# Patient Record
Sex: FEMALE | Race: BLACK OR AFRICAN AMERICAN | Employment: UNEMPLOYED | URBAN - METROPOLITAN AREA
[De-identification: names, ages, dates, MRNs, and addresses within clinical notes are randomized per-mention and may not be internally consistent; named-entity substitution may affect disease eponyms.]

---
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
## 2022-07-29 ENCOUNTER — HOSPITAL ENCOUNTER (EMERGENCY)
Age: 22
Discharge: HOME | End: 2022-07-29

## 2022-07-29 ENCOUNTER — APPOINTMENT (OUTPATIENT)
Dept: ULTRASOUND IMAGING | Age: 22
End: 2022-07-29

## 2022-07-29 VITALS
DIASTOLIC BLOOD PRESSURE: 74 MMHG | BODY MASS INDEX: 17.15 KG/M2 | TEMPERATURE: 97.6 F | SYSTOLIC BLOOD PRESSURE: 125 MMHG | WEIGHT: 106.7 LBS | OXYGEN SATURATION: 100 % | RESPIRATION RATE: 18 BRPM | HEIGHT: 66 IN | HEART RATE: 70 BPM

## 2022-07-29 DIAGNOSIS — N73.9 FEMALE PELVIC INFLAMMATORY DISEASE: ICD-10-CM

## 2022-07-29 DIAGNOSIS — N94.9 CERVICAL MOTION TENDERNESS: ICD-10-CM

## 2022-07-29 DIAGNOSIS — Z20.2 POSSIBLE EXPOSURE TO STD: Primary | ICD-10-CM

## 2022-07-29 LAB
BACTERIA WET PREP: ABNORMAL
BACTERIA WET PREP: ABNORMAL
BACTERIA: ABNORMAL /HPF
BILIRUBIN URINE: NEGATIVE
BLOOD, URINE: ABNORMAL
CLARITY: CLEAR
CLUE CELLS: ABNORMAL
CLUE CELLS: ABNORMAL
COLOR: YELLOW
EPITHELIAL CELLS WET PREP: ABNORMAL
EPITHELIAL CELLS WET PREP: ABNORMAL
EPITHELIAL CELLS, UA: 7 /HPF (ref 0–5)
GLUCOSE URINE: NEGATIVE MG/DL
HCG(URINE) PREGNANCY TEST: NEGATIVE
HYALINE CASTS: 1 /LPF (ref 0–8)
KETONES, URINE: NEGATIVE MG/DL
LEUKOCYTE ESTERASE, URINE: ABNORMAL
MICROSCOPIC EXAMINATION: YES
NITRITE, URINE: NEGATIVE
PH UA: 6 (ref 5–8)
PROTEIN UA: NEGATIVE MG/DL
RBC UA: 2 /HPF (ref 0–4)
RBC WET PREP: ABNORMAL
RBC WET PREP: ABNORMAL
SOURCE WET PREP: ABNORMAL
SOURCE WET PREP: ABNORMAL
SPECIFIC GRAVITY UA: 1.02 (ref 1–1.03)
TRICHOMONAS PREP: ABNORMAL
TRICHOMONAS PREP: ABNORMAL
URINE REFLEX TO CULTURE: YES
URINE TYPE: ABNORMAL
UROBILINOGEN, URINE: 0.2 E.U./DL
WBC UA: 17 /HPF (ref 0–5)
WBC WET PREP: ABNORMAL
WBC WET PREP: ABNORMAL
YEAST WET PREP: ABNORMAL
YEAST WET PREP: ABNORMAL

## 2022-07-29 PROCEDURE — 84703 CHORIONIC GONADOTROPIN ASSAY: CPT

## 2022-07-29 PROCEDURE — 87591 N.GONORRHOEAE DNA AMP PROB: CPT

## 2022-07-29 PROCEDURE — 96372 THER/PROPH/DIAG INJ SC/IM: CPT

## 2022-07-29 PROCEDURE — 76856 US EXAM PELVIC COMPLETE: CPT

## 2022-07-29 PROCEDURE — 87252 VIRUS INOCULATION TISSUE: CPT

## 2022-07-29 PROCEDURE — 6360000002 HC RX W HCPCS: Performed by: PHYSICIAN ASSISTANT

## 2022-07-29 PROCEDURE — 87210 SMEAR WET MOUNT SALINE/INK: CPT

## 2022-07-29 PROCEDURE — 87253 VIRUS INOCULATE TISSUE ADDL: CPT

## 2022-07-29 PROCEDURE — 81001 URINALYSIS AUTO W/SCOPE: CPT

## 2022-07-29 PROCEDURE — 87086 URINE CULTURE/COLONY COUNT: CPT

## 2022-07-29 PROCEDURE — 99284 EMERGENCY DEPT VISIT MOD MDM: CPT

## 2022-07-29 PROCEDURE — 76830 TRANSVAGINAL US NON-OB: CPT

## 2022-07-29 PROCEDURE — 87491 CHLMYD TRACH DNA AMP PROBE: CPT

## 2022-07-29 RX ORDER — DOXYCYCLINE HYCLATE 100 MG
100 TABLET ORAL 2 TIMES DAILY
Qty: 28 TABLET | Refills: 0 | Status: SHIPPED | OUTPATIENT
Start: 2022-07-29 | End: 2022-08-12

## 2022-07-29 RX ORDER — METRONIDAZOLE 500 MG/1
500 TABLET ORAL 2 TIMES DAILY
Qty: 28 TABLET | Refills: 0 | Status: SHIPPED | OUTPATIENT
Start: 2022-07-29 | End: 2022-08-12

## 2022-07-29 RX ORDER — CEFTRIAXONE 500 MG/1
500 INJECTION, POWDER, FOR SOLUTION INTRAMUSCULAR; INTRAVENOUS ONCE
Status: COMPLETED | OUTPATIENT
Start: 2022-07-29 | End: 2022-07-29

## 2022-07-29 RX ADMIN — CEFTRIAXONE SODIUM 500 MG: 500 INJECTION, POWDER, FOR SOLUTION INTRAMUSCULAR; INTRAVENOUS at 14:09

## 2022-07-29 ASSESSMENT — ENCOUNTER SYMPTOMS
ABDOMINAL PAIN: 0
BACK PAIN: 0
EYE PAIN: 0
VOMITING: 0
DIARRHEA: 0
COUGH: 0
SHORTNESS OF BREATH: 0
NAUSEA: 0

## 2022-07-29 ASSESSMENT — PAIN - FUNCTIONAL ASSESSMENT: PAIN_FUNCTIONAL_ASSESSMENT: NONE - DENIES PAIN

## 2022-07-29 NOTE — ED PROVIDER NOTES
**ADVANCED PRACTICE PROVIDER, I HAVE EVALUATED THIS PATIENT**        [address]      Pt Name: Klaus Presley  MUL:[id number]  [place] 2000  Date of evaluation: 7/29/2022  Provider: BERNIE Matthew      Chief Complaint:    Chief Complaint   Patient presents with    Dysuria     Pt with vaginal discomfort, burning with urination, and rash. Nursing Notes, Past Medical Hx, Past Surgical Hx, Social Hx, Allergies, and Family Hx were all reviewed and agreed with or any disagreements were addressed in the HPI.    HPI: (Location, Duration, Timing, Severity, Quality, Assoc Sx, Context, Modifying factors)    Chief Complaint of genital discomfort    This is a  24 y.o. female who presents to the emergency department for evaluation of a 1 week history of genital discomfort, reporting rash, dysuria. She is sexually active and has had a new sexual partner since about a month ago. She does report that they use protection with condoms. She has never had lesions like this before. She does shave and does get occasional ingrown hairs. She denies fever, chills, abdominal pain, weight loss. PastMedical/Surgical History:  No past medical history on file. No past surgical history on file. Medications:  Discharge Medication List as of 7/29/2022  2:13 PM        CONTINUE these medications which have NOT CHANGED    Details   ibuprofen (ADVIL;MOTRIN) 600 MG tablet Take 1 tablet by mouth every 6 hours as needed for Pain, Disp-30 tablet, R-0Print               Review of Systems:  (2-9 systems needed)  Review of Systems   Constitutional:  Negative for chills, fatigue and fever. Eyes:  Negative for pain. Respiratory:  Negative for cough and shortness of breath. Cardiovascular:  Negative for chest pain. Gastrointestinal:  Negative for abdominal pain, diarrhea, nausea and vomiting. Genitourinary:  Positive for dysuria and genital sores. Musculoskeletal:  Negative for back pain, neck pain and neck stiffness. Skin:  Negative for rash. Neurological:  Negative for dizziness and headaches. Psychiatric/Behavioral:  Negative for confusion. \"Positives and Pertinent negatives as per HPI\"    Physical Exam:  Physical Exam  Vitals and nursing note reviewed. Exam conducted with a chaperone present. Constitutional:       General: She is not in acute distress. Appearance: She is well-developed. She is not diaphoretic. HENT:      Head: Normocephalic and atraumatic. Eyes:      General:         Right eye: No discharge. Left eye: No discharge. Pulmonary:      Effort: Pulmonary effort is normal. No respiratory distress. Breath sounds: No stridor. Abdominal:      Palpations: Abdomen is soft. Tenderness: There is no abdominal tenderness. There is no guarding or rebound. Genitourinary:         Comments: Patient has cervical motion tenderness and adnexal tenderness. She does have a few papular erythematous lesions with central russo. I do see a hair follicle present in a few of them. Swabbed these for HSV. There is 1 more ulcerated lesion. No active bleeding, drainage or vesicular lesions present  Musculoskeletal:         General: Normal range of motion. Cervical back: Normal range of motion and neck supple. Skin:     General: Skin is warm and dry. Coloration: Skin is not pale. Neurological:      Mental Status: She is alert and oriented to person, place, and time. Comments: No gross facial drooping. Moves all 4 extremities spontaneously.    Psychiatric:         Behavior: Behavior normal.       MEDICAL DECISION MAKING    Vitals:    Vitals:    07/29/22 0909 07/29/22 1425   BP: 117/75 125/74   Pulse: 66 70   Resp: 15 18   Temp: 97.6 °F (36.4 °C)    TempSrc: Oral    SpO2: 100% 100%   Weight: 106 lb 11.2 oz (48.4 kg)    Height: 5' 6\" (1.676 m)        LABS:  Labs Reviewed   WET PREP, GENITAL - Abnormal; Notable for the following components:       Result Value    Clue Cells, Wet Prep <1+ (*)     All other components within normal limits   WET PREP, GENITAL - Abnormal; Notable for the following components:    Clue Cells, Wet Prep <1+ (*)     All other components within normal limits   URINALYSIS WITH REFLEX TO CULTURE - Abnormal; Notable for the following components:    Blood, Urine TRACE-INTACT (*)     Leukocyte Esterase, Urine SMALL (*)     All other components within normal limits   MICROSCOPIC URINALYSIS - Abnormal; Notable for the following components:    WBC, UA 17 (*)     Epithelial Cells, UA 7 (*)     All other components within normal limits   C.TRACHOMATIS N.GONORRHOEAE DNA, URINE   CULTURE, URINE   CULTURE, HSV   C.TRACHOMATIS N.GONORRHOEAE DNA   PREGNANCY, URINE        Remainder of labs reviewed and were negative at this time or not returned at the time of this note. RADIOLOGY:   Non-plain film images such as CT, Ultrasound and MRI are read by the radiologist. BERNIE Do have directly visualized the radiologic plain film image(s) with the below findings:      Interpretation per the Radiologist below, if available at the time of this note:    [address]   Final Result   Flow is seen in the ovaries      Trace free fluid in pelvis      RECOMMENDATIONS:   Unavailable         US NON OB TRANSVAGINAL   Final Result   Flow is seen in the ovaries      Trace free fluid in pelvis      RECOMMENDATIONS:   Unavailable              US NON OB TRANSVAGINAL    Result Date: 7/29/2022  EXAMINATION: PELVIC ULTRASOUND 7/29/2022 TECHNIQUE: Transabdominal and transvaginal images were performed.   Color Doppler was used COMPARISON: None HISTORY: ORDERING SYSTEM PROVIDED HISTORY: cervical motion tenderness and left adnexal tenderness TECHNOLOGIST PROVIDED HISTORY: Reason for exam:->cervical motion tenderness and left adnexal tenderness FINDINGS: Measurements: Uterus: 6.5 x 3.5 x 2.7 cm Endometrial stripe: 5.6 mm Right Ovary:5.0 x 2.1 x 1.8 cm Left Ovary: 3.3 x 3.2 x 2.1 cm Ultrasound Findings: Uterus: Uterus demonstrates normal myometrial echotexture. Endometrial stripe: Endometrial stripe is within normal limits. Right Ovary: Small focal is a are seen. Morphology appears normal.  Flow is seen Left Ovary: Small follicles are seen. Morphology appears normal.  Flow is seen Free Fluid: Small amount of free fluid seen in pelvis Prominent pelvic vessels are seen on the left, incidentally noted, of doubtful clinical significance     Flow is seen in the ovaries Trace free fluid in pelvis RECOMMENDATIONS: Unavailable     US PELVIS COMPLETE    Result Date: 7/29/2022  EXAMINATION: PELVIC ULTRASOUND 7/29/2022 TECHNIQUE: Transabdominal and transvaginal images were performed. Color Doppler was used COMPARISON: None HISTORY: ORDERING SYSTEM PROVIDED HISTORY: cervical motion tenderness and left adnexal tenderness TECHNOLOGIST PROVIDED HISTORY: Reason for exam:->cervical motion tenderness and left adnexal tenderness FINDINGS: Measurements: Uterus: 6.5 x 3.5 x 2.7 cm Endometrial stripe: 5.6 mm Right Ovary:5.0 x 2.1 x 1.8 cm Left Ovary: 3.3 x 3.2 x 2.1 cm Ultrasound Findings: Uterus: Uterus demonstrates normal myometrial echotexture. Endometrial stripe: Endometrial stripe is within normal limits. Right Ovary: Small focal is a are seen. Morphology appears normal.  Flow is seen Left Ovary: Small follicles are seen.   Morphology appears normal.  Flow is seen Free Fluid: Small amount of free fluid seen in pelvis Prominent pelvic vessels are seen on the left, incidentally noted, of doubtful clinical significance     Flow is seen in the ovaries Trace free fluid in pelvis RECOMMENDATIONS: Unavailable          MEDICAL DECISION MAKING / ED COURSE:      PROCEDURES:   Procedures    None    Patient was given:  Medications   cefTRIAXone (ROCEPHIN) injection 500 mg (500 mg IntraMUSCular Given 7/29/22 1409)       Differential diagnosis: Chlamydia/Gonorrhea that could cause cervicitis, PID, or Exrl-Giev-Shbmxu syndrome or even a Chapos syndrome from Chlamydia, GC arthritis, Gardnerella vaginosis, Yeast vaginitis, Trichomonas, Herpes genitalia, Venereal Warts (condyloma acuminata), Syphilis (Primary-a painless hard chancre after an incubation period of 2-12 weeks, secondary-6-8 weeks after the appearance of the initial chancre, latent-asymptomatic phase, then tertiary which present as gummas in any organ: 1-10 years after initial infection, Cardiovascular: 10-40 years after initial infection, Neurosyphilis: 5-35 years after infection), HIV/AIDS (and the many other sequelae of this disease), Chancroid (Haemophilus ducreyi), Lymphogranuloma venereum or LGV (from Chlamydia trachomatis, relatively rare in the [place], causing the infamous [pseudo]\"Bubo\"), Granuloma inguinale (from Klebsiella granulomatis, also called lupoid ulceration granuloma of the pudenda and granuloma contagiosa (Extremely rare in the [place]). Patient is afebrile, in no acute distress, and nontoxic in appearance with unremarkable vital signs. Given patient history, lack of abnormal vaginal discharge or CMT on exam, and negative wet prep makes PID are unlikely. Lack of ulcerated painful genital lesions makes acute genital herpes outbreak unlikely. Given no characteristic chancre, systemic rash/rash of palms and soles, or other systemic symptoms syphilis is less likely. . Testing for gonorrhea, chlamydia are pending at time of discharge; Patient was treated prophylactically as detailed above. Given her cervical motion tenderness and adnexal tenderness with trace amount of free fluid on ultrasound, will treat for pelvic inflammatory disease with doxycycline, Flagyl, ceftriaxone. At this time I believe patient's presentation does not warrant further workup with labs or imaging in the emergency department and is stable for discharge home.  I discussed with Addie Olivarez and/or family the exam results, diagnosis, care, prognosis, reasons to return and the importance of follow up. Patient will be discharged with instructions to follow up with the primary care physician for reevaluation in the next few days, and to return to the emergency department for any worsening symptoms or further concerns. I instructed the patient to have an outpatient HIV and Syphilis test, to be ordered by the follow-up doctor. I instructed the patient not to have sex for 2 weeks. They verbalized understanding and were discharged in stable condition. Lisa Morocho is well appearing, non-toxic, and afebrile at the time of discharge. The patient tolerated their visit well. I evaluated the patient. The physician was available for consultation as needed. The patient and / or the family were informed of the results of any tests, a time was given to answer questions, a plan was proposed and they agreed with plan. CLINICAL IMPRESSION:  1. Possible exposure to STD    2. Cervical motion tenderness    3. Female pelvic inflammatory disease        DISPOSITION Decision To Discharge 07/29/2022 02:19:21 PM      PATIENT REFERRED TO:    follow up with your PCP Dr. Bert Chin an appointment as soon as possible for a visit   ED follow up    [facility] Emergency Department  [address] [place] [address]  [phone]    If symptoms worsen    DISCHARGE MEDICATIONS:  Discharge Medication List as of 7/29/2022  2:13 PM        START taking these medications    Details   doxycycline hyclate (VIBRA-TABS) 100 MG tablet Take 1 tablet by mouth in the morning and 1 tablet before bedtime. Do all this for 14 days. , Disp-28 tablet, R-0Normal      metroNIDAZOLE (FLAGYL) 500 MG tablet Take 1 tablet by mouth in the morning and 1 tablet before bedtime. Do all this for 14 days. , Disp-28 tablet, R-0Normal             DISCONTINUED MEDICATIONS:  Discharge Medication List as of 7/29/2022  2:13 PM (Please note the MDM and HPI sections of this note were completed with a voice recognition program.  Efforts were made to edit the dictations but occasionally words are mis-transcribed.)    Electronically signed, Fortunato Clement,           Fortunato Clement  07/29/22 152

## 2022-07-29 NOTE — DISCHARGE INSTRUCTIONS
Ultrasound was normal    You have Bacterial Vaginosis  No trichomonas    Gonorrhea and chlamydia results are still pending although you are being treated for this regardless. Not have sexual intercourse until you complete all of these antibiotics. Your partner needs to be tested as well. Please follow-up with your primary care provider.

## 2022-07-30 LAB — URINE CULTURE, ROUTINE: NORMAL

## 2022-07-31 LAB
FINAL REPORT: NORMAL
PRELIMINARY: NORMAL

## 2022-07-31 NOTE — RESULT ENCOUNTER NOTE
Culture reviewed, please contact patient and inform them of the results and want a prescription for valacyclovir 1000 mg by mouth 2 times daily for 10 days. Follow-up with primary care provider or GYN.

## 2022-08-01 LAB
C TRACH DNA GENITAL QL NAA+PROBE: NEGATIVE
N. GONORRHOEAE DNA: NEGATIVE

## 2022-08-01 NOTE — RESULT ENCOUNTER NOTE
Patient's positive result has been appropriately evaluated by the provider pool. Patient was contacted and notified of the change in treatment plan.     Medication was phoned to the patient's pharmacy per protocol:    Pharmacy:   [facility] [address] [place] [address] [phone] Carl Oh [phone]  [address] [place] [id number]  Phone: [phone] Fax: [phone]    Phone number: [phone]  Pharmacist receiving the prescription: message left on voicemail

## 2022-08-02 LAB
C TRACH DNA GENITAL QL NAA+PROBE: NEGATIVE
N. GONORRHOEAE DNA: NEGATIVE

## 2022-12-17 ENCOUNTER — HOSPITAL ENCOUNTER (EMERGENCY)
Age: 22
Discharge: HOME | End: 2022-12-17
Attending: EMERGENCY MEDICINE

## 2022-12-17 VITALS
TEMPERATURE: 98.3 F | HEIGHT: 65 IN | SYSTOLIC BLOOD PRESSURE: 120 MMHG | HEART RATE: 88 BPM | DIASTOLIC BLOOD PRESSURE: 78 MMHG | BODY MASS INDEX: 19.1 KG/M2 | OXYGEN SATURATION: 98 % | WEIGHT: 114.64 LBS | RESPIRATION RATE: 16 BRPM

## 2022-12-17 DIAGNOSIS — O23.599 BACTERIAL VAGINOSIS IN PREGNANCY: Primary | ICD-10-CM

## 2022-12-17 DIAGNOSIS — B96.89 BACTERIAL VAGINOSIS IN PREGNANCY: Primary | ICD-10-CM

## 2022-12-17 LAB
BACTERIA WET PREP: ABNORMAL
BACTERIA: ABNORMAL /HPF
BILIRUBIN URINE: NEGATIVE
BLOOD, URINE: NEGATIVE
CLARITY: CLEAR
CLUE CELLS: ABNORMAL
COLOR: YELLOW
EPITHELIAL CELLS WET PREP: ABNORMAL
EPITHELIAL CELLS, UA: ABNORMAL /HPF (ref 0–5)
GLUCOSE URINE: NEGATIVE MG/DL
KETONES, URINE: NEGATIVE MG/DL
LEUKOCYTE ESTERASE, URINE: ABNORMAL
MICROSCOPIC EXAMINATION: YES
MUCUS: ABNORMAL /LPF
NITRITE, URINE: NEGATIVE
PH UA: 6 (ref 5–8)
PROTEIN UA: NEGATIVE MG/DL
RBC UA: ABNORMAL /HPF (ref 0–4)
RBC WET PREP: ABNORMAL
SOURCE WET PREP: ABNORMAL
SPECIFIC GRAVITY UA: 1.02 (ref 1–1.03)
TRICHOMONAS PREP: ABNORMAL
TRICHOMONAS: ABNORMAL /HPF
URINE REFLEX TO CULTURE: ABNORMAL
URINE TYPE: ABNORMAL
UROBILINOGEN, URINE: 0.2 E.U./DL
WBC UA: ABNORMAL /HPF (ref 0–5)
WBC WET PREP: ABNORMAL
YEAST WET PREP: ABNORMAL

## 2022-12-17 PROCEDURE — 81001 URINALYSIS AUTO W/SCOPE: CPT

## 2022-12-17 PROCEDURE — 87491 CHLMYD TRACH DNA AMP PROBE: CPT

## 2022-12-17 PROCEDURE — 87591 N.GONORRHOEAE DNA AMP PROB: CPT

## 2022-12-17 PROCEDURE — 99283 EMERGENCY DEPT VISIT LOW MDM: CPT

## 2022-12-17 PROCEDURE — 87210 SMEAR WET MOUNT SALINE/INK: CPT

## 2022-12-17 RX ORDER — METRONIDAZOLE 7.5 MG/G
GEL VAGINAL
Qty: 70 G | Refills: 0 | Status: SHIPPED | OUTPATIENT
Start: 2022-12-17 | End: 2022-12-24

## 2022-12-17 ASSESSMENT — ENCOUNTER SYMPTOMS
SHORTNESS OF BREATH: 0
BACK PAIN: 0
EYE DISCHARGE: 0
SORE THROAT: 0
RHINORRHEA: 0
NAUSEA: 0
COUGH: 0
ABDOMINAL PAIN: 0
WHEEZING: 0
DIARRHEA: 0
EYE PAIN: 0
VOMITING: 0

## 2022-12-17 ASSESSMENT — PAIN - FUNCTIONAL ASSESSMENT
PAIN_FUNCTIONAL_ASSESSMENT: NONE - DENIES PAIN
PAIN_FUNCTIONAL_ASSESSMENT: NONE - DENIES PAIN

## 2022-12-17 NOTE — ED NOTES
Dc'd to home  Aware to follow up with pmd/OB  Walked out with ease  Aware how to use meds       Pam Rob RN  12/17/22 5534

## 2022-12-17 NOTE — ED PROVIDER NOTES
[id number] [facility]  eMERGENCY dEPARTMENT eNCOUnter        Pt Name: Edmundo Rios  MRN: [id number]  [place] 2000  Date of evaluation: 2022  Provider: Shannen Gee MD  PCP: [id number]       Chief Complaint   Patient presents with    Vaginal Discharge     Irritation  20 weeks pregnant       HISTORY OFPRESENT ILLNESS   (Location/Symptom, Timing/Onset, Context/Setting, Quality, Duration, Modifying Factors,Severity)  Note limiting factors. Edmundo Rios is a 25 y.o. female   with a history of    has no past medical history on file. Who presents with mental irritation for 2 days. Patient is  1 para 0 20 weeks pregnant. No abdominal pain no gush or leakage of fluid or bleeding. Just irritation in the vagina. She called her gynecologist who recommended the patient come to the emergency department to be evaluated. Nursing Noteswere all reviewed and agreed with or any disagreements were addressed  in the HPI. REVIEW OF SYSTEMS    (2-9 systems for level 4, 10 or more for level 5)     Review of Systems   Constitutional:  Negative for chills, fatigue and fever. HENT:  Negative for ear pain, rhinorrhea and sore throat. Eyes:  Negative for pain, discharge and visual disturbance. Respiratory:  Negative for cough, shortness of breath and wheezing. Cardiovascular:  Negative for chest pain, palpitations and leg swelling. Gastrointestinal:  Negative for abdominal pain, diarrhea, nausea and vomiting. Genitourinary:  Positive for vaginal discharge. Negative for difficulty urinating, dysuria and pelvic pain. Musculoskeletal:  Negative for arthralgias, back pain, joint swelling and neck pain. Skin:  Negative for rash. Allergic/Immunologic: Negative for environmental allergies. Neurological:  Negative for dizziness, seizures, syncope and headaches. Hematological:  Negative for adenopathy.    Psychiatric/Behavioral: Negative for dysphoric mood and suicidal ideas. The patient is not nervous/anxious. PAST MEDICAL HISTORY   History reviewed. No pertinent past medical history. SURGICAL HISTORY   History reviewed. No pertinent surgical history. CURRENTMEDICATIONS       Previous Medications    No medications on file       ALLERGIES     Patient has no known allergies. FAMILY HISTORY     History reviewed. No pertinent family history. SOCIAL HISTORY       Social History     Socioeconomic History    Marital status: Single     Spouse name: None    Number of children: None    Years of education: None    Highest education level: None   Tobacco Use    Smoking status: Never    Smokeless tobacco: Never   Vaping Use    Vaping Use: Never used   Substance and Sexual Activity    Alcohol use: Never    Drug use: Yes     Types: Marijuana (Weed)     Comment: occ       SCREENINGS    [place] Coma Scale  Eye Opening: Spontaneous  Best Verbal Response: Oriented  Best Motor Response: Obeys commands  Rocio Coma Scale Score: 15        PHYSICAL EXAM    (up to 7 for level 4, 8 or more for level 5)     ED Triage Vitals [12/17/22 1026]   BP Temp Temp Source Heart Rate Resp SpO2 Height Weight   120/78 98.3 °F (36.8 °C) Oral 88 16 98 % 5' 5\" (1.651 m) 114 lb 10.2 oz (52 kg)      height is 5' 5\" (1.651 m) and weight is 114 lb 10.2 oz (52 kg). Her oral temperature is 98.3 °F (36.8 °C). Her blood pressure is 120/78 and her pulse is 88. Her respiration is 16 and oxygen saturation is 98%. Physical Exam  Vitals and nursing note reviewed. Exam conducted with a chaperone present. Constitutional:       Appearance: She is well-developed. She is not diaphoretic. HENT:      Head: Normocephalic and atraumatic. Right Ear: External ear normal.      Left Ear: External ear normal.   Eyes:      General: No scleral icterus. Right eye: No discharge. Left eye: No discharge.       Conjunctiva/sclera: Conjunctivae normal.   Neck: Trachea: No tracheal deviation. Pulmonary:      Effort: Pulmonary effort is normal. No respiratory distress. Breath sounds: No stridor. Abdominal:      Palpations: Abdomen is soft. Tenderness: There is no abdominal tenderness. There is no guarding or rebound. Comments: Gravid uterus. Dome of uterus just below the umbilicus. Fetal heart tones of 140. Genitourinary:     Comments: Exam chaperoned by the emergency department nurse Denise Álvarez. External genitalia normal.  She does have a white vaginal discharge. pH was elevated. The nitrazine paper turned light blue. Otherwise no bleeding in the vault. Nontender benign exam.  Cervix is closed  Musculoskeletal:         General: Normal range of motion. Cervical back: Normal range of motion. Skin:     General: Skin is warm and dry. Neurological:      General: No focal deficit present. Mental Status: She is alert and oriented to person, place, and time.       Coordination: Coordination normal.   Psychiatric:         Behavior: Behavior normal.       DIAGNOSTIC RESULTS   LABS:    Results for orders placed or performed during the hospital encounter of 12/17/22   Wet prep, genital    Specimen: Vaginal   Result Value Ref Range    Trichomonas Prep None Seen     Yeast, Wet Prep None Seen     Clue Cells, Wet Prep <1+ (A)     WBC, Wet Prep 1+     RBC, Wet Prep <1+     Epi Cells <1+     Bacteria 1+     Source Wet Prep Vaginal    Urinalysis with Reflex to Culture    Specimen: Urine   Result Value Ref Range    Color, UA Yellow Straw/Yellow    Clarity, UA Clear Clear    Glucose, Ur Negative Negative mg/dL    Bilirubin Urine Negative Negative    Ketones, Urine Negative Negative mg/dL    Specific Gravity, UA 1.020 1.005 - 1.030    Blood, Urine Negative Negative    pH, UA 6.0 5.0 - 8.0    Protein, UA Negative Negative mg/dL    Urobilinogen, Urine 0.2 <2.0 E.U./dL    Nitrite, Urine Negative Negative    Leukocyte Esterase, Urine MODERATE (A) Negative Microscopic Examination YES     Urine Type Cleancatch     Urine Reflex to Culture Not Indicated    Microscopic Urinalysis   Result Value Ref Range    Mucus, UA 1+ (A) None Seen /LPF    WBC, UA 3-5 0 - 5 /HPF    RBC, UA 3-4 0 - 4 /HPF    Epithelial Cells, UA 6-10 (A) 0 - 5 /HPF    Bacteria, UA 1+ (A) None Seen /HPF    Trichomonas, UA None Seen None Seen /HPF       All other labs were within normal range or not returned as of this dictation. EKG: All EKG's are interpreted by the Emergency Department Physician who either signs orCo-signs this chart in the absence of a cardiologist.    None    RADIOLOGY:   plain film images such as CT, Ultrasound and MRI are read by the radiologist. Plain radiographic images are visualized and preliminarily interpreted by the  EDProvider with the below findings:    None        PROCEDURES   Bedside ultrasound performed by Dr. Shane Hu. Images saved to the machine. Active fetal movement noted. Fetal heart tones 140. Procedures    CRITICAL CARE TIME   N/A    CONSULTS:  None    EMERGENCY DEPARTMENT COURSE and DIFFERENTIAL DIAGNOSIS/MDM:   Vitals:    Vitals:    12/17/22 1026   BP: 120/78   Pulse: 88   Resp: 16   Temp: 98.3 °F (36.8 °C)   TempSrc: Oral   SpO2: 98%   Weight: 114 lb 10.2 oz (52 kg)   Height: 5' 5\" (1.651 m)       Patient was given the following medications:  Medications - No data to display    Exam history physical and diagnostics were consistent with bacterial vaginosis. We will treat with MetroGel vaginal.    Is this patient to be included in the SEP-1 Core Measure due to severe sepsis or septic shock? No   Exclusion criteria - the patient is NOT to be included for SEP-1 Core Measure due to:  2+ SIRS criteria are not met    FINAL IMPRESSION      1.  Bacterial vaginosis in pregnancy          DISPOSITION/PLAN   DISPOSITION Decision To Discharge 12/17/2022 11:24:47 AM      PATIENT REFERRED TO:  [address]:  New Prescriptions METRONIDAZOLE (METROGEL VAGINAL) 0.75 % VAGINAL GEL    Place vaginally 2 times daily for 7 days.        DISCONTINUED MEDICATIONS:  Discontinued Medications    IBUPROFEN (ADVIL;MOTRIN) 600 MG TABLET    Take 1 tablet by mouth every 6 hours as needed for Pain              (Please note that portions of this note were completed with a voice recognition program.  Efforts were made to editthe dictations but occasionally words are mis-transcribed.)    Diamond Noel MD (electronically signed)            Diamond Noel MD  12/17/22 8284

## 2022-12-20 LAB
C TRACH DNA GENITAL QL NAA+PROBE: NEGATIVE
N. GONORRHOEAE DNA: NEGATIVE

## 2025-03-18 LAB
ALBUMIN: 4.8 G/DL (ref 3.5–5.7)
ALP BLD-CCNC: 72 IU/L (ref 35–135)
ALT SERPL-CCNC: 6 IU/L (ref 10–60)
ANION GAP SERPL CALCULATED.3IONS-SCNC: 10 MMOL/L (ref 4–16)
AST SERPL-CCNC: 12 IU/L (ref 10–40)
BILIRUB SERPL-MCNC: 0.7 MG/DL (ref 0–1.2)
BUN BLDV-MCNC: 8 MG/DL (ref 8–26)
CALCIUM SERPL-MCNC: 9.3 MG/DL (ref 8.5–10.4)
CHLAMYDIA TRACHOMATIS AMPLIFIED DET: NOT DETECTED
CHLORIDE BLD-SCNC: 105 MMOL/L (ref 98–111)
CO2: 26 MMOL/L (ref 21–31)
CREAT SERPL-MCNC: 0.53 MG/DL (ref 0.6–1.2)
EGFR (CKD-EPI): 132 ML/MIN/1.73 M2
GLUCOSE BLD-MCNC: 77 MG/DL (ref 70–99)
GONADOTROPIN, CHORIONIC (HCG) QUANT: <5 MIU/ML
HB: SOURCE: NORMAL
HCT VFR BLD CALC: 43.2 % (ref 36–46)
HEMOGLOBIN: 14.6 G/DL (ref 12–15.2)
MCH RBC QN AUTO: 31.6 PG (ref 27–33)
MCHC RBC AUTO-ENTMCNC: 33.8 G/DL (ref 32–36)
MCV RBC AUTO: 93.5 FL (ref 82–97)
N GONORRHOEAE AMPLIFIED DET: NOT DETECTED
PDW BLD-RTO: 13 % (ref 12.3–17)
PLATELET # BLD: 333 THOU/MCL (ref 140–375)
PMV BLD AUTO: 8.1 FL (ref 7.4–11.5)
POTASSIUM SERPL-SCNC: 3.6 MMOL/L (ref 3.6–5.1)
RBC # BLD: 4.62 MIL/MCL (ref 3.8–5.2)
SODIUM BLD-SCNC: 141 MMOL/L (ref 135–145)
SPECIMEN TYPE: NORMAL
TOTAL PROTEIN: 7.5 G/DL (ref 6–8)
TSH ULTRASENSITIVE: 0.64 MCIU/ML (ref 0.27–4.2)
WBC # BLD: 6.2 THOU/MCL (ref 3.6–10.5)